# Patient Record
Sex: MALE | Race: ASIAN | NOT HISPANIC OR LATINO | URBAN - METROPOLITAN AREA
[De-identification: names, ages, dates, MRNs, and addresses within clinical notes are randomized per-mention and may not be internally consistent; named-entity substitution may affect disease eponyms.]

---

## 2019-06-03 ENCOUNTER — EMERGENCY (EMERGENCY)
Facility: HOSPITAL | Age: 39
LOS: 1 days | Discharge: HOME | End: 2019-06-03
Attending: EMERGENCY MEDICINE | Admitting: EMERGENCY MEDICINE
Payer: COMMERCIAL

## 2019-06-03 VITALS
RESPIRATION RATE: 18 BRPM | HEART RATE: 97 BPM | DIASTOLIC BLOOD PRESSURE: 91 MMHG | OXYGEN SATURATION: 100 % | SYSTOLIC BLOOD PRESSURE: 135 MMHG | TEMPERATURE: 100 F

## 2019-06-03 VITALS — TEMPERATURE: 99 F

## 2019-06-03 LAB
APPEARANCE UR: CLEAR — SIGNIFICANT CHANGE UP
BILIRUB UR-MCNC: NEGATIVE — SIGNIFICANT CHANGE UP
BLOOD UR QL VISUAL: NEGATIVE — SIGNIFICANT CHANGE UP
COLOR SPEC: YELLOW — SIGNIFICANT CHANGE UP
GLUCOSE UR-MCNC: NEGATIVE — SIGNIFICANT CHANGE UP
KETONES UR-MCNC: NEGATIVE — SIGNIFICANT CHANGE UP
LEUKOCYTE ESTERASE UR-ACNC: NEGATIVE — SIGNIFICANT CHANGE UP
NITRITE UR-MCNC: NEGATIVE — SIGNIFICANT CHANGE UP
PH UR: 5.5 — SIGNIFICANT CHANGE UP (ref 5–8)
PROT UR-MCNC: NEGATIVE — SIGNIFICANT CHANGE UP
SP GR SPEC: 1.02 — SIGNIFICANT CHANGE UP (ref 1–1.04)
UROBILINOGEN FLD QL: HIGH

## 2019-06-03 PROCEDURE — 99283 EMERGENCY DEPT VISIT LOW MDM: CPT

## 2019-06-03 RX ORDER — HYDROCORTISONE 1 %
1 OINTMENT (GRAM) TOPICAL ONCE
Refills: 0 | Status: COMPLETED | OUTPATIENT
Start: 2019-06-03 | End: 2019-06-03

## 2019-06-03 RX ORDER — LIDOCAINE/HYDROCORTISONE AC 3 %-0.5 %
1 CREAM WITH APPLICATOR RECTAL
Qty: 5 | Refills: 0
Start: 2019-06-03 | End: 2019-06-07

## 2019-06-03 RX ADMIN — Medication 1 SUPPOSITORY(S): at 23:23

## 2019-06-03 NOTE — ED ADULT NURSE NOTE - NSIMPLEMENTINTERV_GEN_ALL_ED
Implemented All Universal Safety Interventions:  [place] to call system. Call bell, personal items and telephone within reach. Instruct patient to call for assistance. Room bathroom lighting operational. Non-slip footwear when patient is off stretcher. Physically safe environment: no spills, clutter or unnecessary equipment. Stretcher in lowest position, wheels locked, appropriate side rails in place.

## 2019-06-03 NOTE — ED PROVIDER NOTE - OBJECTIVE STATEMENT
38M with hx of external hemorrhoids (s/p incision for thrombosis) presenting with rectal pain 2/2 hemorrhoid for 3 days. Has not had a bowel movement since then. No rectal bleeding, fever, chills, abdominal pain, nausea, vomiting. Has been trying warm baths, which has not helped. Also complaining of decreased urination over the past few days. Denies dysuria, flank pain.

## 2019-06-03 NOTE — ED PROVIDER NOTE - PROGRESS NOTE DETAILS
MAGGIE Norris pt feels better ambulating without difficulty.  UA negative.  Results reviewed with patient.  Discharge reviewed and discussed with patient.

## 2019-06-03 NOTE — ED PROVIDER NOTE - NSFOLLOWUPINSTRUCTIONS_ED_ALL_ED_FT
Your diagnosis: external hemorrhoid, nonthrombosed    Discharge instructions:    1. Please follow up with a colorectal surgeon as soon as you can after discharge from the ED.    2. Take any prescribed medications as instructed: apply hydrocortisone-lidocaine cream to hemorrhoid 2-3 times a day as needed for pain relief.    3. Others:    4. Be sure to return to the ED if you develop new or worsening symptoms. Specific signs and symptoms to be vigilant of: worsening abdominal pain, pain that radiates to the back or groin, pain that turns from vague to sharp, associated nausea or vomiting, worsening diarrhea or constipation, blood in the stool, black, tarry stools, excessive vomiting, blood or bile in the vomit, pain associated with lightheadedness, shortness of breath or chest pain. Your diagnosis: external hemorrhoid, nonthrombosed    Discharge instructions:    1. Please follow up with a colorectal surgeon as soon as you can after discharge from the ED.    2. Take any prescribed medications as instructed: apply hydrocortisone-lidocaine cream to hemorrhoid 2-3 times a day as needed for pain relief. Continue with warm sitz baths and stool softeners.    3. Others:    4. Be sure to return to the ED if you develop new or worsening symptoms. Specific signs and symptoms to be vigilant of: worsening abdominal pain, pain that radiates to the back or groin, pain that turns from vague to sharp, associated nausea or vomiting, worsening diarrhea or constipation, blood in the stool, black, tarry stools, excessive vomiting, blood or bile in the vomit, pain associated with lightheadedness, shortness of breath or chest pain.

## 2019-06-03 NOTE — ED PROVIDER NOTE - NS ED ROS FT
GENERAL: no fever, chills  HEENT: no cough, congestion, odynophagia, dysphagia  CARDIAC: no chest pain, palpitations, lightheadedness  PULM: no dyspnea, wheezing   GI: + hemorrhoid, rectal pain  : no urinary dysuria, frequency, incontinence, hematuria  NEURO: no headache, motor weakness, sensory changes  MSK: no joint or muscle pain  SKIN: no rashes, hives  HEME: no active bleeding, bruising

## 2019-06-03 NOTE — ED ADULT NURSE NOTE - OBJECTIVE STATEMENT
pt received in intake, A&Ox3, history of external hemorrhoids, c/o increased rectal pain. Pain has been increased for past 2 days, pt states he has not had BM since then. Denies any bleeding, dysuria, fevers. Urine sent. Will continue to monitor.

## 2019-06-03 NOTE — ED PROVIDER NOTE - ATTENDING CONTRIBUTION TO CARE
Rosemary: 39 yo male with a h/o external hemorrhoids Rosemary: 39 yo male with a h/o external hemorrhoids c/o 3 days of rectal pain which feels like his previous hemorrhoids. In the past he has required I&D of the thrombosed hemorrhoids multiple times but never followed up with surgery. No fevers or chills. Pt also c/o feeling like he hasn't completely emptied his bladder with urination. NO dysuria or pain with urination. No abdominal pain, nausea or vomiting. + sexual activity, unprotected with wife only. Exam: GENERAL: well appearing, NAD, HEENT: MMM, CARDIO: +S1/S2, no murmurs, rubs or gallops, LUNGS: CTA B/L, no wheezing, rales or rhonchi, ABD: soft, nontender, BSx4 quadrants, no guarding or rigidity. RECTAL: large nonthrombosed external hemorrhoid at 9 o'clock position, on prostate TTP or bogginess, chaperoned by me and done by Dr Mayen  MSK: No CVA TTP NEURO: AxOx3, SKIN: no rashes or lesions, well perfused A/P- 39 yo male with hemorrhoid and r/o urinary retention. post vois bedside US shows completely collapsed bladder so no retention. will obtain u/a and culture and d/c with symptomatic treatment.

## 2019-06-03 NOTE — ED PROVIDER NOTE - PHYSICAL EXAMINATION
GENERAL: no acute distress, non-toxic appearing  HEAD: normocephalic, atraumatic  HEENT: normal conjunctiva, oral mucosa moist, neck supple  CARDIAC: regular rate and rhythm, normal S1 and S2,  no appreciable murmurs  PULM: clear to ascultation bilaterally, no crackles, rales, rhonchi, or wheezing  GI: abdomen nondistended, soft, nontender, no guarding or rebound tenderness; large non-thrombosed hemorrhoid; no prostate tenderness on rectal exam  NEURO: alert and oriented x 3, normal speech, PERRLA, EOMI, no focal motor or sensory deficits, nonantalgic gait  MSK: no visible deformities, no peripheral edema, calf tenderness/redness/swelling  SKIN: no visible rashes, dry, well-perfused  PSYCH: appropriate mood and affect    Chaperone: Dr. Riley

## 2019-06-03 NOTE — ED ADULT TRIAGE NOTE - CHIEF COMPLAINT QUOTE
Pt arrives c/o rectum pain reports is due to hemorrhoid states pain since Saturday.  Reports unable to have BM since sat & feels difficulty to begin urine stream due to pressure in abdomen.  Pt reports has had x4 hemorrhoidectomy's in the past.  Denies blood in stool. Pt vitally stable.

## 2019-06-03 NOTE — ED PROVIDER NOTE - CLINICAL SUMMARY MEDICAL DECISION MAKING FREE TEXT BOX
38M with hx of external hemorrhoids (s/p incision for thrombosis) presenting with rectal pain 2/2 external hemorrhoid. Non-thrombosed, not ready for incision. Plan - pain control, ua/uc, likely d/c with outpatient surgery follow up.

## 2019-06-04 LAB
C TRACH RRNA SPEC QL NAA+PROBE: SIGNIFICANT CHANGE UP
N GONORRHOEA RRNA SPEC QL NAA+PROBE: SIGNIFICANT CHANGE UP
SPECIMEN SOURCE: SIGNIFICANT CHANGE UP

## 2019-06-05 LAB
BACTERIA UR CULT: SIGNIFICANT CHANGE UP
SPECIMEN SOURCE: SIGNIFICANT CHANGE UP